# Patient Record
Sex: MALE | NOT HISPANIC OR LATINO | URBAN - METROPOLITAN AREA
[De-identification: names, ages, dates, MRNs, and addresses within clinical notes are randomized per-mention and may not be internally consistent; named-entity substitution may affect disease eponyms.]

---

## 2017-02-23 ENCOUNTER — OFFICE VISIT - RIVER FALLS (OUTPATIENT)
Dept: FAMILY MEDICINE | Facility: CLINIC | Age: 32
End: 2017-02-23

## 2017-02-23 ASSESSMENT — MIFFLIN-ST. JEOR: SCORE: 1950.51

## 2017-05-25 ENCOUNTER — OFFICE VISIT - RIVER FALLS (OUTPATIENT)
Dept: FAMILY MEDICINE | Facility: CLINIC | Age: 32
End: 2017-05-25

## 2017-05-25 ASSESSMENT — MIFFLIN-ST. JEOR: SCORE: 1928.73

## 2017-05-30 ENCOUNTER — AMBULATORY - RIVER FALLS (OUTPATIENT)
Dept: FAMILY MEDICINE | Facility: CLINIC | Age: 32
End: 2017-05-30

## 2022-02-11 VITALS
HEIGHT: 71 IN | TEMPERATURE: 98.1 F | SYSTOLIC BLOOD PRESSURE: 122 MMHG | HEART RATE: 92 BPM | DIASTOLIC BLOOD PRESSURE: 68 MMHG | WEIGHT: 214.2 LBS | BODY MASS INDEX: 29.99 KG/M2

## 2022-02-11 VITALS
DIASTOLIC BLOOD PRESSURE: 80 MMHG | OXYGEN SATURATION: 99 % | BODY MASS INDEX: 30.66 KG/M2 | HEART RATE: 87 BPM | TEMPERATURE: 98 F | SYSTOLIC BLOOD PRESSURE: 136 MMHG | HEIGHT: 71 IN | WEIGHT: 219 LBS

## 2022-02-16 NOTE — PROGRESS NOTES
Patient:   NANCY REIS            MRN: [id number]            FIN: [id number]               Age:   31 years     Sex:  Male     :  1985   Associated Diagnoses:   Adult ADHD   Author:   Felix Rodriguez MD      Visit Information      Date of Service: 2017 05:10 pm  Performing Location: [facility]  Encounter#: [id number]   Visit type:  Scheduled follow-up.    History limitation:  None.       Chief Complaint   2017 5:45 PM CST    Patient presents today for a medication check.      History of Present Illness             The patient presents with attention deficit disorder follow up.  There have been no problems with the medication.  He seems to get headaches when he takes the additional short acting medication..        Review of Systems   Constitutional:  Negative.    Eye:  Negative.    Cardiovascular:  No palpitations, No tachycardia.    Gastrointestinal:  Negative.    Musculoskeletal:  Negative.    Neurologic:  Alert and oriented X4, No headache.    Psychiatric:  No anxiety, No depression.       Health Status   Allergies:    Allergic Reactions (Selected)  No Known Medication Allergies   Problem list:    All Problems  Adult ADHD / SNOMED CT 3933381524 / Confirmed  Obesity / SNOMED CT 1070419604 / Probable   Medications:  (Selected)   Prescriptions  Prescribed  Adderall 10 mg oral tablet: 1 tab(s) ( 10 mg ), po, daily, # 30 tab(s), 0 Refill(s), Type: Maintenance, Pharmacy: [facility] [id number], 1 tab(s) po daily  Adderall XR 30 mg oral capsule, extended release: 1 cap(s) ( 30 mg ), po, qam, # 30 cap(s), 0 Refill(s), Type: Maintenance, Pharmacy: [facility] [id number], 1 cap(s) po qam      Histories   Past Medical History:    No active or resolved past medical history items have been selected or recorded.   Family History:    No family history items have been selected or recorded.   Procedure history:    No active procedure history items have been selected or recorded.    Social History:        Alcohol Assessment: Current            Current                     Comments:                      02/08/2016 - Beatrice Huynh                     1-2 times per week      Tobacco Assessment: Past      Substance Abuse Assessment: Current            Current, Marijuana      Employment and Education Assessment            Employed, Work/School description: .      Exercise and Physical Activity Assessment: Regular exercise            Exercise type: Running.                     Comments:                      02/08/2016 - Beatrice Huynh                     Patient runs 4-5 t imes per week.        Physical Examination   Vital Signs   2/23/2017 5:45 PM CST Temperature Tympanic 98.0 DegF    Peripheral Pulse Rate 87 bpm    HR Method Electronic    Systolic Blood Pressure 136 mmHg    Diastolic Blood Pressure 80 mmHg    Mean Arterial Pressure 99 mmHg    BP Site Right arm    BP Method Manual    Oxygen Saturation 99 %      Measurements from flowsheet : Measurements   2/23/2017 5:45 PM CST Height Measured - Standard 71 in    Weight Measured - Standard 219 lb    BSA 2.23 m2    Body Mass Index 30.54 kg/m2      General:  Alert and oriented.    Eye:  Extraocular movements are intact.    Respiratory:  Lungs are clear to auscultation.    Cardiovascular:  Normal rate, Regular rhythm.    Psychiatric:  Cooperative, Appropriate mood & affect.       Review / Management   Course:  Progressing as expected.       Impression and Plan   Diagnosis     Adult ADHD ([id number] F90.0).     Course:  Progressing as expected.    Plan:  increase IR dose to 20 mg as needed, continue with current ER dose.         Follow-up: In 3 months.    Orders     Orders (Selected)   Prescriptions  Prescribed  Adderall 20 mg oral tablet: 1 tab(s) ( 20 mg ), po, daily, # 30 tab(s), 0 Refill(s), Type: Maintenance, Pharmacy: [facility] [id number], 1 tab(s) po daily  Adderall XR 30 mg oral capsule, extended release: 1 cap(s) ( 30  mg ), po, qam, # 30 cap(s), 0 Refill(s), Type: Maintenance, Pharmacy: [id number], 1 cap(s) po qam.     Counseled:  Patient, Regarding medications.    Patient Instructions:  Launch follow-up (if licensed).       Professional Services   Counseling Summary:  The total time spent counseling the patient was 15 minutes.

## 2022-02-16 NOTE — PROGRESS NOTES
Patient:   NANCY REIS            MRN: [id number]            FIN: [id number]               Age:   31 years     Sex:  Male     :  1985   Associated Diagnoses:   Adult ADHD   Author:   Felix Rodriguez MD      Visit Information      Date of Service: 2017 06:44 pm  Performing Location: [facility]  Encounter#: [id number]      Primary Care Provider (PCP):  Felix Rodriguez MD    NPI# [id number]   Visit type:  Scheduled follow-up.    History limitation:  None.       Chief Complaint   2017 6:48 PM CDT    ADHD f/u, refill meds.        History of Present Illness             The patient presents with attention deficit disorder follow up.  There have been no problems with the medication.  There are no other current concerns..        Review of Systems   Constitutional:  Negative.    Eye:  Negative.    Cardiovascular:  No palpitations, No tachycardia.    Gastrointestinal:  Negative.    Musculoskeletal:  Negative.    Neurologic:  Alert and oriented X4, No headache.    Psychiatric:  No anxiety, No depression.       Health Status   Allergies:    Allergic Reactions (Selected)  No Known Medication Allergies   Problem list:    All Problems  Adult ADHD / SNOMED CT 7882373392 / Confirmed  Obesity / SNOMED CT 3338870470 / Probable   Medications:  (Selected)   Prescriptions  Prescribed  Adderall 20 mg oral tablet: 1 tab(s) ( 20 mg ), po, daily, # 30 tab(s), 0 Refill(s), Type: Maintenance, [id number], 1 tab(s) po daily  Adderall XR 30 mg oral capsule, extended release: 1 cap(s) ( 30 mg ), po, qam, # 30 cap(s), 0 Refill(s), Type: Maintenance      Histories   Past Medical History:    No active or resolved past medical history items have been selected or recorded.   Family History:    No family history items have been selected or recorded.   Procedure history:    No active procedure history items have been selected or recorded.   Social History:        Alcohol Assessment:  Current            Current                     Comments:                      02/08/2016 - Beatrice Huynh                     1-2 times per week      Tobacco Assessment: Past      Substance Abuse Assessment: Current            Current, Marijuana      Employment and Education Assessment            Employed, Work/School description: .      Exercise and Physical Activity Assessment: Regular exercise            Exercise type: Running.                     Comments:                      02/08/2016 - Beatrice Huynh                     Patient runs 4-5 t imes per week.        Physical Examination   Vital Signs   5/25/2017 6:48 PM CDT Temperature Tympanic 98.1 DegF    Peripheral Pulse Rate 92 bpm    Pulse Site Radial artery    HR Method Manual    Systolic Blood Pressure 122 mmHg    Diastolic Blood Pressure 68 mmHg    Mean Arterial Pressure 86 mmHg    BP Site Right arm    BP Method Manual      Measurements from flowsheet : Measurements   5/25/2017 6:48 PM CDT Height Measured - Standard 71 in    Weight Measured - Standard 214.2 lb    BSA 2.2 m2    Body Mass Index 29.87 kg/m2      General:  Alert and oriented.    Eye:  Extraocular movements are intact.    Respiratory:  Lungs are clear to auscultation.    Cardiovascular:  Normal rate, Regular rhythm.    Psychiatric:  Cooperative, Appropriate mood & affect.       Review / Management   Course:  Progressing as expected.       Impression and Plan   Diagnosis     Adult ADHD ([id number] F90.0).     Course:  Progressing as expected.    Plan:  continue current medication.         Follow-up: In 4 months.    Orders     Orders (Selected)   Prescriptions  Prescribed  Adderall 20 mg oral tablet: 1 tab(s) ( 20 mg ), po, daily, # 30 tab(s), 0 Refill(s), Type: Maintenance, Pharmacy: [facility] [id number], 1 tab(s) po daily  Adderall XR 30 mg oral capsule, extended release: 1 cap(s) ( 30 mg ), po, qam, # 30 cap(s), 0 Refill(s), Type: Maintenance, Pharmacy: [facility], 1 cap(s) po qa.     Counseled:  Patient, Regarding medications.    Patient Instructions:  Launch follow-up (if licensed).       Professional Services   Counseling Summary:  The total time spent counseling the patient was 15 minutes.    WIPDMP queried and no problems identified.